# Patient Record
Sex: FEMALE
[De-identification: names, ages, dates, MRNs, and addresses within clinical notes are randomized per-mention and may not be internally consistent; named-entity substitution may affect disease eponyms.]

---

## 2021-08-25 ENCOUNTER — APPOINTMENT (OUTPATIENT)
Dept: ENDOCRINOLOGY | Facility: CLINIC | Age: 51
End: 2021-08-25
Payer: COMMERCIAL

## 2021-08-25 VITALS
SYSTOLIC BLOOD PRESSURE: 136 MMHG | BODY MASS INDEX: 24 KG/M2 | DIASTOLIC BLOOD PRESSURE: 80 MMHG | WEIGHT: 127 LBS | HEART RATE: 69 BPM

## 2021-08-25 DIAGNOSIS — Z83.49 FAMILY HISTORY OF OTHER ENDOCRINE, NUTRITIONAL AND METABOLIC DISEASES: ICD-10-CM

## 2021-08-25 DIAGNOSIS — E06.3 AUTOIMMUNE THYROIDITIS: ICD-10-CM

## 2021-08-25 PROCEDURE — 99203 OFFICE O/P NEW LOW 30 MIN: CPT

## 2021-08-26 PROBLEM — Z83.49 FAMILY HISTORY OF THYROID DISEASE: Status: ACTIVE | Noted: 2021-08-26

## 2021-08-26 RX ORDER — LIFITEGRAST 50 MG/ML
5 SOLUTION/ DROPS OPHTHALMIC
Refills: 0 | Status: ACTIVE | COMMUNITY

## 2021-08-26 RX ORDER — NORETHINDRONE ACETATE AND ETHINYL ESTRADIOL, ETHINYL ESTRADIOL AND FERROUS FUMARATE 1MG-10(24)
1 MG-10 MCG / KIT ORAL
Refills: 0 | Status: ACTIVE | COMMUNITY

## 2021-08-26 NOTE — ASSESSMENT
[FreeTextEntry1] : Hypothyroid, likely due to Hashimoto's.\par goal TSH 0.5-4.0 range, will adjust dose of thyroxine, if necessary.\par \par Pituitary adenoma history\par check prolactin; if high, will send for MRI\par RTO 1 year

## 2021-08-26 NOTE — HISTORY OF PRESENT ILLNESS
[FreeTextEntry1] : Hypothyroidism diagnosed about 10 years ago.  Previously saw Dr Ca.\par was diagnosed when she was trying for second baby (not successful). SHe has a 27 year old son.\par All three of her sisters have  thyroid disease \par About the same time, she was found to have small pituitary adenoma and was treated with cabergoline for 7 years.\par no headache or vision changes\par she was put on estrogen/progestin pill for fibroids and she is still taking it.  She has no menses on her current pill.\par Currently, has some fatigue.  no palpitations or feeling jittery \par She tries to be healthy, eating healthy and keeping active.\par vaccinated for Covid\par mother has diabetes \par \par Meds:\par levothyroxine 125mcg/day\par Lo Loestrin 1/120\par Xiidra drops\par

## 2021-08-26 NOTE — PHYSICAL EXAM
[Alert] : alert [No Acute Distress] : no acute distress [No Proptosis] : no proptosis [No Lid Lag] : no lid lag [Normal Hearing] : hearing was normal [No LAD] : no lymphadenopathy [Clear to Auscultation] : lungs were clear to auscultation bilaterally [Normal S1, S2] : normal S1 and S2 [Regular Rhythm] : with a regular rhythm [No Stigmata of Cushings Syndrome] : no stigmata of Cushings Syndrome [Normal Affect] : the affect was normal [Normal Mood] : the mood was normal [de-identified] : thyroid palpable [de-identified] : mild, minimal acanthosis nigricans

## 2021-08-27 LAB
PROLACTIN SERPL-MCNC: 25.6 NG/ML
THYROGLOB AB SERPL-ACNC: <20 IU/ML
THYROPEROXIDASE AB SERPL IA-ACNC: 233 IU/ML
TSH SERPL-ACNC: 0.55 UIU/ML